# Patient Record
Sex: MALE | URBAN - METROPOLITAN AREA
[De-identification: names, ages, dates, MRNs, and addresses within clinical notes are randomized per-mention and may not be internally consistent; named-entity substitution may affect disease eponyms.]

---

## 2019-04-05 ENCOUNTER — HISTORICAL (OUTPATIENT)
Dept: ADMINISTRATIVE | Facility: HOSPITAL | Age: 54
End: 2019-04-05

## 2019-05-01 ENCOUNTER — HISTORICAL (OUTPATIENT)
Dept: ENDOSCOPY | Facility: HOSPITAL | Age: 54
End: 2019-05-01

## 2019-05-17 ENCOUNTER — HISTORICAL (OUTPATIENT)
Dept: ADMINISTRATIVE | Facility: HOSPITAL | Age: 54
End: 2019-05-17

## 2019-05-17 LAB
ABS NEUT (OLG): 14.35 X10(3)/MCL (ref 2.1–9.2)
ALBUMIN SERPL-MCNC: 1.6 GM/DL (ref 3.4–5)
ALBUMIN/GLOB SERPL: 0.3 RATIO (ref 1.1–2)
ALP SERPL-CCNC: 1134 UNIT/L (ref 45–117)
ALT SERPL-CCNC: 93 UNIT/L (ref 12–78)
AST SERPL-CCNC: 140 UNIT/L (ref 15–37)
BASOPHILS NFR BLD MANUAL: 0 %
BILIRUB SERPL-MCNC: 19.5 MG/DL (ref 0.2–1)
BILIRUBIN DIRECT+TOT PNL SERPL-MCNC: 15.6 MG/DL
BILIRUBIN DIRECT+TOT PNL SERPL-MCNC: 3.9 MG/DL
BUN SERPL-MCNC: 8 MG/DL (ref 7–18)
CALCIUM SERPL-MCNC: 8.4 MG/DL (ref 8.5–10.1)
CANCER AG19-9 SERPL-ACNC: 10.4 IU/ML (ref 0–35)
CEA SERPL-MCNC: 2.8 NG/ML
CHLORIDE SERPL-SCNC: 102 MMOL/L (ref 98–107)
CO2 SERPL-SCNC: 23 MMOL/L (ref 21–32)
CREAT SERPL-MCNC: 0.6 MG/DL (ref 0.6–1.3)
EOSINOPHIL NFR BLD MANUAL: 4 %
ERYTHROCYTE [DISTWIDTH] IN BLOOD BY AUTOMATED COUNT: 14.1 % (ref 11.5–14.5)
GLOBULIN SER-MCNC: 5.4 GM/ML (ref 2.3–3.5)
GLUCOSE SERPL-MCNC: 99 MG/DL (ref 74–106)
GRANULOCYTES NFR BLD MANUAL: 84 % (ref 43–75)
HCT VFR BLD AUTO: 40.1 % (ref 40–51)
HGB BLD-MCNC: 13.5 GM/DL (ref 13.5–17.5)
LYMPHOCYTES NFR BLD MANUAL: 12 % (ref 20.5–51.1)
MCH RBC QN AUTO: 32.1 PG (ref 26–34)
MCHC RBC AUTO-ENTMCNC: 33.7 GM/DL (ref 31–37)
MCV RBC AUTO: 95.5 FL (ref 80–100)
MONOCYTES NFR BLD MANUAL: 0 % (ref 2–9)
PLATELET # BLD AUTO: 343 X10(3)/MCL (ref 130–400)
PLATELET # BLD EST: ADEQUATE 10*3/UL
PMV BLD AUTO: 10.2 FL (ref 7.4–10.4)
POTASSIUM SERPL-SCNC: 3.1 MMOL/L (ref 3.5–5.1)
PROT SERPL-MCNC: 7 GM/DL (ref 6.4–8.2)
RBC # BLD AUTO: 4.2 X10(6)/MCL (ref 4.5–5.9)
RBC MORPH BLD: NORMAL
SODIUM SERPL-SCNC: 134 MMOL/L (ref 136–145)
WBC # SPEC AUTO: 16.2 X10(3)/MCL (ref 4.5–11)

## 2019-05-21 ENCOUNTER — HISTORICAL (OUTPATIENT)
Dept: RADIOLOGY | Facility: HOSPITAL | Age: 54
End: 2019-05-21

## 2019-05-24 ENCOUNTER — HOSPITAL ENCOUNTER (OUTPATIENT)
Dept: MEDSURG UNIT | Facility: HOSPITAL | Age: 54
End: 2019-05-29
Attending: INTERNAL MEDICINE | Admitting: INTERNAL MEDICINE

## 2019-05-24 ENCOUNTER — HISTORICAL (OUTPATIENT)
Dept: INFUSION THERAPY | Facility: HOSPITAL | Age: 54
End: 2019-05-24

## 2019-05-24 LAB
ABS NEUT (OLG): 13.25 X10(3)/MCL (ref 2.1–9.2)
ALBUMIN SERPL-MCNC: 1.2 GM/DL (ref 3.4–5)
ALBUMIN/GLOB SERPL: 0.3 RATIO (ref 1.1–2)
ALP SERPL-CCNC: 979 UNIT/L (ref 45–117)
ALT SERPL-CCNC: 75 UNIT/L (ref 12–78)
AST SERPL-CCNC: 154 UNIT/L (ref 15–37)
BASOPHILS NFR BLD MANUAL: 0 %
BILIRUB SERPL-MCNC: 15.5 MG/DL (ref 0.2–1)
BILIRUBIN DIRECT+TOT PNL SERPL-MCNC: 12.9 MG/DL
BILIRUBIN DIRECT+TOT PNL SERPL-MCNC: 2.6 MG/DL
BUN SERPL-MCNC: 14 MG/DL (ref 7–18)
CALCIUM SERPL-MCNC: 7.4 MG/DL (ref 8.5–10.1)
CHLORIDE SERPL-SCNC: 106 MMOL/L (ref 98–107)
CO2 SERPL-SCNC: 23 MMOL/L (ref 21–32)
CREAT SERPL-MCNC: 0.4 MG/DL (ref 0.6–1.3)
EOSINOPHIL NFR BLD MANUAL: 0 %
ERYTHROCYTE [DISTWIDTH] IN BLOOD BY AUTOMATED COUNT: 14.5 % (ref 11.5–14.5)
GLOBULIN SER-MCNC: 4.2 GM/ML (ref 2.3–3.5)
GLUCOSE SERPL-MCNC: 59 MG/DL (ref 74–106)
GRANULOCYTES NFR BLD MANUAL: 90 % (ref 43–75)
HCT VFR BLD AUTO: 35.2 % (ref 40–51)
HGB BLD-MCNC: 12.3 GM/DL (ref 13.5–17.5)
LYMPHOCYTES NFR BLD MANUAL: 4 % (ref 20.5–51.1)
MCH RBC QN AUTO: 33.3 PG (ref 26–34)
MCHC RBC AUTO-ENTMCNC: 34.9 GM/DL (ref 31–37)
MCV RBC AUTO: 95.4 FL (ref 80–100)
MONOCYTES NFR BLD MANUAL: 4 % (ref 2–9)
NEUTS BAND NFR BLD MANUAL: 2 % (ref 0–10)
PLATELET # BLD AUTO: 256 X10(3)/MCL (ref 130–400)
PLATELET # BLD EST: ADEQUATE 10*3/UL
PMV BLD AUTO: 10.2 FL (ref 7.4–10.4)
POTASSIUM SERPL-SCNC: 2.8 MMOL/L (ref 3.5–5.1)
PROT SERPL-MCNC: 5.4 GM/DL (ref 6.4–8.2)
RBC # BLD AUTO: 3.69 X10(6)/MCL (ref 4.5–5.9)
RBC MORPH BLD: NORMAL
SODIUM SERPL-SCNC: 137 MMOL/L (ref 136–145)
WBC # SPEC AUTO: 14.6 X10(3)/MCL (ref 4.5–11)

## 2019-05-25 LAB
APPEARANCE, UA: ABNORMAL
BACTERIA #/AREA URNS AUTO: ABNORMAL /[HPF]
BILIRUB UR QL STRIP: >10 MG/DL
COLOR UR: ABNORMAL
GLUCOSE (UA): NORMAL
HGB UR QL STRIP: NEGATIVE
HYALINE CASTS #/AREA URNS LPF: ABNORMAL /[LPF]
KETONES UR QL STRIP: NEGATIVE
LEUKOCYTE ESTERASE UR QL STRIP: NEGATIVE
NITRITE UR QL STRIP: NEGATIVE
PH UR STRIP: 6 [PH] (ref 4.5–8)
PROT UR QL STRIP: 50 MG/DL
RBC #/AREA URNS AUTO: ABNORMAL /[HPF]
SP GR UR STRIP: 1.03 (ref 1–1.03)
SQUAMOUS #/AREA URNS LPF: ABNORMAL /[LPF]
UROBILINOGEN UR STRIP-ACNC: 2 MG/DL
WBC #/AREA URNS AUTO: ABNORMAL /HPF

## 2022-05-01 NOTE — H&P
* Final Report *  Subjective Feeling ok today. Was able to eat yesterday.   Review of Systems Constitutional: no fever, + fatigue, + weakness  Eye: + jaundice  Respiratory: no cough, no wheezing, no shortness of breath  Cardiovascular: no chest pain, no palpitations, no edema  Gastrointestinal: no nausea, vomiting, or diarrhea. No abdominal pain  Integumentary: no skin rash or abnormal lesion  Neurologic: no headache, no dizziness, no asterixis, no weakness or numbness    Objective Vitals & Measurements T: 36.3 °C (Oral) TMIN: 36.3 °C (Oral) TMAX: 37.2 °C (Oral) HR: 71(Peripheral) HR: 19(Monitored) RR: 18 BP: 136/79 SpO2: 98%   Physical Exam General: ill appeairng in no acute distress  Eye: PERRLA, EOMI, + icteric  HENT: oropharynx without erythema/exudate, oropharynx and nasal mucosal surfaces moist  Neck: no thyromegaly or lymphadenopathy, trachea midline  Respiratory: symmetrical chest expansion and respiratory effort, clear to auscultation bilaterally  Cardiovascular: regular rate and rhythm without murmurs, gallops or rubs  Gastrointestinal: soft, non-tender, non-distended with normal bowel sounds, without masses to palpation  Integumentary: no rashes or skin lesions present  Neurologic: cranial nerves intact, no asterixis, awake, alert, and oriented  Psych: good insight, appropriate mood, normal affect  Lab Results   Test Name Test Result Date/Time Comments   Sodium Lvl 135 mmol/L (Low) 04/05/2019 05:00 CDT    Potassium Lvl 3.0 mmol/L (Low) 04/05/2019 05:00 CDT    Chloride 100 mmol/L 04/05/2019 05:00 CDT    CO2 29 mmol/L 04/05/2019 05:00 CDT    Calcium Lvl 8.2 mg/dL (Low) 04/05/2019 05:00 CDT    Glucose Lvl 106 mg/dL 04/05/2019 05:00 CDT    BUN 7 mg/dL 04/05/2019 05:00 CDT    Creatinine 0.50 mg/dL (Low) 04/05/2019 05:00 CDT    Bili Total 18.6 mg/dL (High) 04/05/2019 05:00 CDT    Bili Direct 15.0 mg/dL (High) 04/05/2019 05:00 CDT    Bili Indirect 3.6 mg/dL (High) 04/05/2019 05:00 CDT    AST 70 unit/L (High)  04/05/2019 05:00 CDT    ALT 68 unit/L 04/05/2019 05:00 CDT    Alk Phos 495 unit/L (High) 04/05/2019 05:00 CDT    Total Protein 6.5 gm/dL 04/05/2019 05:00 CDT    Albumin Lvl 1.8 gm/dL (Low) 04/05/2019 05:00 CDT    PT 16.1 second(s) (High) 04/04/2019 05:04 CDT Low intensity therapy  PT: 17.9-22.2 seconds  INR: 1.5- 2.0    Mod. intensity therapy  PT: 22.2- 30.2 SECONDS  INR: 2.0-3.0    High intensity therapy  PT: 26.3 - 34 SEC  INR: 2.5-3.5   INR 1.30 (High) 04/04/2019 05:04 CDT    WBC 16.0 x10(3)/mcL (High) 04/05/2019 05:00 CDT    Hgb 10.7 gm/dL (Low) 04/05/2019 05:00 CDT    Hct 30.0 % (Low) 04/05/2019 05:00 CDT    Platelet 319 x10(3)/mcL 04/05/2019 05:00 CDT    Assessment/Plan Alcohol liver disease  12 cm liver mass  Periportal and retroperitoneal adenopathy (preliminary + for carcinoma)  weight loss  Anorexia  Normocytic anemia    Liver mass:  Given his history, primary concern is for primary HCC in the setting of alcohol liver disease. While entities such as cholangiocarcinoma and metastatic process are still possible as well, HCC is probably more likely.  AFP 2.3  s/p IR biopsy of liver mass on 04/04/19  s/p EUS today which revealed extensive periportal adenopathy compression the CBD along with multiple retroperitoneal and periaortic adenopathy. FNB of periportal adenopathy preliminarily positive for carcinoma  Will await liver mass and adenopathy pathology to discern what type of carcinoma this is.  He will not be a candidate for liver transplant if this is HCC  It does not appear that he will be a good candidate for loco-regional therapy due to markedly elevated TB, CTP C, and evidence of disease spread already (adenopathy).  If this is HCC, he could be considered for sorafenib. If cholangiocarcinoma or metastatic process, chemotherapy can be considered. If metastatic process, then endoscopy would be needed to look for primary.      Alcohol liver disease:  MELD 19, CTP C  No steroids needed  Encourage high  protein, 2 gm sodium diet. Supplemental nutrition recommended  Alcohol cessation  Monitor LFTs and INR  No varices on EGD/EUS today  Mild ascites seen on EUS.   No HE  Check HBsAb and vaccinate if not immune  Immune to HAV    Biliary dilation:  EUS confirmed extrinsic compression of the mid/upper CBD by adenopathy with upstream dilation and downstream decompression. There was no biliary stasis (i.e. sludge) in the proximal CBD and gallbladder is contracted.  It is conceivable that this is not affecting any biliary drainage but difficult to prove without empiric placement of CBD stent and then seeing if bilirubin would improve.   Regardless large part of bilirubin elevation is due to tumor burden and some component of alcohol liver disease.  I would await biopsy results to help determine prognosis and only plan on any potential intervention if it would change/alter treatment outcomes      Result type: Gastroenterology Progress Note   Result date: April 05, 2019 17:29 CDT   Result status: Auth (Verified)   Result title: Progress/SOAP Note   Performed by: Alisa Adames MD on April 05, 2019 18:03 CDT   Verified by: Alisa Adames MD on April 05, 2019 18:03 CDT   Encounter info: [id number], [facility], Inpatient, 4/1/2019 - 4/10/2019

## 2022-05-04 NOTE — HISTORICAL [facility] CERNER
This is a historical note converted from [employer]. Formatting and pictures may have been removed.  Please reference [employer] for original formatting and attached multimedia. Chief Complaint  Liver Cancer  History of Present Illness  Ms. 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 Patient seems to?patient?was barely able to stand on the scales when he arrived. He has a history of alcohol abuse and originally presented in December with a 20 pound weight loss and elevated LFTs.? Percutaneous liver biopsy was performed in April and significant for metastatic poorly differentiated carcinoma of occult primary.? This month, she is planning to initiate chemo.? Patient was told that his prognosis was very poor at that time.? Presented today with a drastic change in prognosis.? Per the infusion center staff, he was able to take care of himself and able to do regular ADLs a week ago.? When he presented today he had extreme weakness and could not even get on the scale without help.? Patient reported extreme weakness and primarily being bedbound in the last week.? He also reports shortness of breath, abdominal pain, no worsening of lower extremity swelling.? With the assistance of , infusion center care team and I had a discussion with the patient about his prognosis.? Per the infusion center in Dr. Mcpherson, treatment and chemotherapy is no longer an option.? They feel that he is no longer able to care for himself.? They would like to have the patient admitted that he can be evaluated for inpatient hospice as he has no family here to care for him.? All of these things were discussed with the patient and he expressed understanding.? He would like to be evaluated by hospice for end-of-life care.  Review of Systems  10-point ROS performed and negative except  as above.  Physical Exam  Vitals & Measurements  T:?36.4? ?C (Oral)? HR:?102(Peripheral)? HR:?102(Monitored)? RR:?18? BP:?89/60? WT:?75?kg?  Gen:?Ill-appearing, cachectic 53yo male  HEENT: Scleral icterus noted. ?Normocephalic, atraumatic.  Heart: RRR, no murmurs, gallops, clicks or rubs.  Lungs: CTAB without rales, wheezes or rhonchi. Normal work of breathing. Chest rise symmetrical on inspiration.  Abd: RUQ abdominal tenderness to palpation. No guarding. Hepatomegaly.?Bowel sounds present.  Extremities: 4+LE pitting edema bilaterally. Radial 1+ BL. Pedal pulses non-palpable due to edema. Lower extremities are cool to the touch.  MSK: No obvious deformities. Moves all extremities purposefully.  Neuro: Responds well to commands.  Skin: Warm, dry and without rashes.  Assessment/Plan  End-Stage Liver Cancer  ?? - admit patient in-patient care and bridge to hospice  ?? - will consult case management to assistance hospice evaluation  ?? - will initiate comfort measures  ?? - continue IVF for now?  ?  Severe Hypokalemia  ?? - K 2.8  ?? - Will give 80mEq KCl  ?  ?  Disposition: This is a 54-year-old? male?with history of alcohol?use and now end-stage?liver cancer.? Hospice to evaluate for end-of-life care in an inpatient hospice facility. ?Patient will be discharged to hospice once accepted.  ?  ?  ?   Halle Jaquez, DO  Internal Medicine PGY-1  Pager [phone]   Problem List/Past Medical History  Ongoing  Adenocarcinoma of liver  Alcoholism  Diabetes mellitus type II  Hepatitis  Hyperbilirubinaemia  Jaundice  Historical  No qualifying data  Procedure/Surgical History  Colonoscopy (None) (05/01/2019)  Colonoscopy, flexible; with removal of tumor(s), polyp(s), or other lesion(s) by snare technique (05/01/2019)  Esophagogastroduodenoscopy (05/01/2019)  Esophagogastroduodenoscopy, flexible, transoral; diagnostic, including collection of specimen(s) by brushing or washing, when performed (separate procedure)  (05/01/2019)  Excision of Ascending Colon, Via Natural or Artificial Opening Endoscopic, Diagnostic (05/01/2019)  Inspection of Upper Intestinal Tract, Via Natural or Artificial Opening Endoscopic (05/01/2019)  Polypectomy (05/01/2019)  Drainage of Mesenteric Lymphatic, Via Natural or Artificial Opening Endoscopic Approach, Diagnostic (04/05/2019)  Endoscopic Ultrasonography (Upper) (04/05/2019)  Esophagogastroduodenoscopy (Upper) (04/05/2019)  Esophagogastroduodenoscopy, flexible, transoral; with transendoscopic ultrasound-guided intramural or transmural fine needle aspiration/biopsy(s), (includes endoscopic ultrasound examination limited to the esophagus, stomach or duodenum, and adjacent stru (04/05/2019)  Ultrasonography of Abdomen and Pelvis (04/05/2019)  US FNA First Lesion (Upper) (04/05/2019)  Excision of Left Lobe Liver, Percutaneous Approach, Diagnostic (04/04/2019)   Medications  Inpatient  NS (0.9% Sodium Chloride) Infusion, 1000 mL, IV, Once  Home  Flagyl 500 mg / 100 ml premix, IV, q8hr  K-Dur 20 oral tablet, extended release, 20 mEq= 1 tab(s), Oral, BID  megestrol 40 mg/mL oral suspension  oxyCODONE 10 mg oral tablet, Oral, q6hr  senna 8.6 mg oral tablet, Oral, qPM  Zofran ODT 8 mg oral tablet, disintegrating, 8 mg= 1 tab(s), Oral, q6hr, PRN  Allergies  No Known Medication Allergies  Social History  Alcohol  Past, 05/21/2019  Sexual  Gender Identity Identifies as male., 05/22/2019  Substance Abuse  Never, 04/01/2019  Tobacco  Never (less than 100 in lifetime), N/A, 05/24/2019  Family History  Diabetes mellitus type 2: Mother.  Unknown cause of morbidity or mortality 07-JUN-2017 17:45:38<$>: Father.  Lab Results  Labs Last 24 Hours?  ?Chemistry? Hematology/Coagulation?   Sodium Lvl: 137 mmol/L (05/24/19 11:45:00) WBC:?14.6 x10(3)/mcL?High (05/24/19 11:53:00)   Potassium Lvl:?2.8 mmol/L?Critical (05/24/19 11:45:00) RBC:?3.69 x10(6)/mcL?Low (05/24/19 11:53:00)   Chloride: 106 mmol/L (05/24/19 11:45:00)  Hgb:?12.3 gm/dL?Low (05/24/19 11:53:00)   CO2: 23 mmol/L (05/24/19 11:45:00) Hct:?35.2 %?Low (05/24/19 11:53:00)   Calcium Lvl:?7.4 mg/dL?Low (05/24/19 11:45:00) Platelet: 256 x10(3)/mcL (05/24/19 11:53:00)   Glucose Lvl:?59 mg/dL?Low (05/24/19 11:45:00) MCV: 95.4 fL (05/24/19 11:53:00)   BUN: 14 mg/dL (05/24/19 11:45:00) MCH: 33.3 pg (05/24/19 11:53:00)   Creatinine:?0.4 mg/dL?Low (05/24/19 11:45:00) MCHC: 34.9 gm/dL (05/24/19 11:53:00)   eGFR-AA: >105 (05/24/19 11:45:00) RDW: 14.5 % (05/24/19 11:53:00)   eGFR-TALYA: >105 (05/24/19 11:45:00) MPV: 10.2 fL (05/24/19 11:53:00)   Bili Total:?15.5 mg/dL?High (05/24/19 11:45:00) Abs Neut:?13.25 x10(3)/mcL?High (05/24/19 11:53:00)   Bili Direct:?12.9 mg/dL?High (05/24/19 11:45:00)    Bili Indirect:?2.6 mg/dL?High (05/24/19 11:45:00)    AST:?154 unit/L?High (05/24/19 11:45:00)    ALT: 75 unit/L (05/24/19 11:45:00)    Alk Phos:?979 unit/L?High (05/24/19 11:45:00)    Total Protein:?5.4 gm/dL?Low (05/24/19 11:45:00)    Albumin Lvl:?1.2 gm/dL?Low (05/24/19 11:45:00)    Globulin:?4.2 gm/mL?High (05/24/19 11:45:00)    A/G Ratio:?0.3 ratio?Low (05/24/19 11:45:00)    ?  ?      Patient was brought to the medicine for care until hospice can arrive.? I went to once again speak with patient regarding all the things that had previously been addressed in the infusion clinic.? Richard ([id number]) with the language line was used to translate.? I confirmed that he did understand that this was the end of his life and that he would likely pass away soon.? The patient expressed understanding.? I again confirmed that he does want to go hospice.? He agreed with this.? We then discussed CODE STATUS.? I explained to him what always had failed.? I told him that the use of CPR, defibrillation, and intubation would not prolong his line in fact would likely cause him pain should he survive a code.? He expressed understanding.? He is chosen to go DNR at this time.? Hospice will arrive shortly to evaluate  him for inpatient hospice.   Reviewed with resident, agree with assessment and plan.   Patient seen and examined,?reviewed with the resident,?agree with?assessment?and?plan.? We will proceed with end-of-life care as?agreed upon with patient?and begin placement for?hospice care in nursing home. ?Patient is DNR/DNI comfort care

## 2022-05-04 NOTE — HISTORICAL [facility] CERNER
This is a historical note converted from [employer]. Formatting and pictures may have been removed.  Please reference [employer] for original formatting and attached multimedia. Chief Complaint  Weakness, fatigue, elevated liver enzymes and jaundice  History of Present Illness  54 year old  male with?DM who presented with weakness and weight loss and found to have elevated LFTs and liver mass. He does endorse a 20 LB weight loss in December.?He noticed more fatigue in March and April and was seen by gastroenterology. He does have a previous history of ETOH abuse, drinking up to 12 beers daily but has quit several months ago. The patient underwent IR biopsy of the liver mass which revealed carcinoma. He did have some periportal lymphadenopathy. He has never had an endoscopy or colonoscopy.  Review of Systems  +fatigue, weakness  +light colored stool, dark colored urine  ?  Physical Exam  Gen: awake, alert, jaundice  HEENT: scleral icterus  Resp: lungs CTA B/L  CV: RRR  Abd: soft, nt, nd  ?  ?  Radiology  Reason For Exam  Liver mass  ?  Radiology Report  PROCEDURE: CT guided Biopsy of a left liver mass.?  ?  INDICATION: 54 years of age Male requiring left liver mass biopsy for  histologic evaluation.  ?  ATTENDING: Denzel Valle  ?  ANESTHESIA: Local and Moderate Sedation - Sedation was provided by  physician: An independent trained observer, sedation nurse was present  to assist in the monitoring of the patients level of consciousness and  physiologic status.  ?  Moderate Sedation was performed for 20 minutes.  ?  ?  TECHNIQUE/FINDINGS:  After the risks, benefits and alternatives were discussed, consent was  obtained. ?A time out was performed to verify the patients identity  and procedure.  ?  The left liver mass was localized with CT to confirm a window for  biopsy. The skin over the biopsy site was cleaned and prepped in  normal sterile fashion. Subcutaneous tissues were anesthetized with a  lidocaine solution. A  18-gauge coaxial needle was advanced to the  mass.?  ?  After confirming the position the stylus was removed and 2 core  biopsies were obtained. The pathologist was present and confirmed  adequacy of the samples.?  ?  The patient tolerated the procedure well. ?  ?  Estimated blood loss: < 10 mL.  ?  Followup CT scan demonstrated no evidence of hematoma.  ?  IMPRESSION:  CT -guided left liver mass biopsy as described above.  ?  Reason For Exam  Neoplasm Primary  ?  Radiology Report  History:  Liver mass.  ?  Reference Exam:  CT to April 2019.  ?  Radiopharmaceutical:  Tc99m MDP 30.2 mCi IV.  ?  Findings:  Whole-body images were obtained.  ?  There is a single punctate focus of radiotracer activity localizing to  a mid left rib. There is a tiny sclerotic lesion within the left  fourth rib on the comparison CT image 124 series 4.  ?  Otherwise no suspicious focal radiotracer activity identified.  ?  Impression:  Single subtle focus of radiotracer activity which may correspond to a  tiny sclerotic lesion of the left fourth rib. Recommend attention on  future surveillance CTs.  ?  Otherwise no scintigraphic evidence of osseous metastatic disease.  ?  ?  Assessment/Plan  54 year old  male with?DM who presented with weakness and weight loss and found to have elevated LFTs and liver mass.s/p IR biopsy of liver mass on 04/04/19  s/p EUS on 4/5 which revealed extensive periportal adenopathy compression the CBD along with multiple retroperitoneal and periaortic adenopathy.? FNB of periportal adenopathy preliminarily positive for carcinoma.  ?   -vitals, labs and images reviewed  -proceed with EGD/colonoscopy today  -discussed procedure with patient and consents obtained  ?   Yessi Gil MD  [employer] General Surgery   Problem List/Past Medical History  Ongoing  Alcoholism  Diabetes mellitus type II  Hepatitis  Hyperbilirubinaemia  Jaundice  Historical  No qualifying data  Procedure/Surgical History  Drainage of Mesenteric  Lymphatic, Via Natural or Artificial Opening Endoscopic Approach, Diagnostic (04/05/2019)  Endoscopic Ultrasonography (Upper) (04/05/2019)  Esophagogastroduodenoscopy (Upper) (04/05/2019)  Esophagogastroduodenoscopy, flexible, transoral; with transendoscopic ultrasound-guided intramural or transmural fine needle aspiration/biopsy(s), (includes endoscopic ultrasound examination limited to the esophagus, stomach or duodenum, and adjacent stru (04/05/2019)  Ultrasonography of Abdomen and Pelvis (04/05/2019)  US FNA First Lesion (Upper) (04/05/2019)  Excision of Left Lobe Liver, Percutaneous Approach, Diagnostic (04/04/2019)   Medications  Inpatient  No active inpatient medications  Home  Flagyl 500 mg / 100 ml premix, IV, q8hr  folic acid 1 mg oral tablet, 1 mg= 1 tab(s), Oral, Daily  Protonix 40 mg ORAL enteric coated tablet, 40 mg= 1 tab(s), Oral, Daily  thiamine 100 mg oral tablet, 100 mg= 1 tab(s), Oral, Daily  Allergies  No Known Medication Allergies  Social History  Alcohol  Current, 04/01/2019  Substance Abuse  Never, 04/01/2019  Tobacco  Never (less than 100 in lifetime), No, 04/05/2019  Never (less than 100 in lifetime), N/A, 04/04/2019  Family History  Diabetes mellitus type 2: Mother.  Unknown cause of morbidity or mortality 07-JUN-2017 17:45:38<$>: Father.      Recent diagnosis of liver mass consistent with a carcinoma.? IHC not necessarily?conclusive for hepatocellular carcinoma. ?He is here for upper and lower endoscopy to look for primary?cancer.? He continues to have a poor appetite and weight loss.? He is accompanied by his boss today.

## 2022-05-04 NOTE — HISTORICAL [facility] CERNER
This is a historical note converted from [employer]. Formatting and pictures may have been removed.  Please reference [employer] for original formatting and attached multimedia. 0915: LARRY Salgado in infusion called to inform me that Mr. 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 Upon entering the room he is laying upright in the bed in Rm 529, jaundice appearance of skin and sclera of the eye. HR: 102, BP: 89/60. AL Salgado states that they were unable to weigh him due to his inability to stand and that it took 3 staff to get him from the wheelchair to the bed. Since his last visit with Dr. Mcpherson he has gone from walking, driving, and working (ECO-1) to now being completed bed or wheelchair bound (ECOG 3-4). Per Dr. Mcpherson we will not start chemotherapy that we will consult Hospice or send him to the ER to be admitted. The patients worsening condition and functional status was discussed with him, as well as with Miya, nursing aide who speaks Tajik, as well as Samia and Dr. Jaquez from internal medicine. The patient does not speak English which is why a staff member who speaks fluent Mongolian was asked to interpret. He verbalizes that he understands his prognosis and that he wants Hospice at this time. Hospice was consulted and medicine staff admitted the patient here and assumed care.

## 2022-05-04 NOTE — HISTORICAL [facility] CERNER
This is a historical note converted from Mica. Formatting and pictures may have been removed.  Please reference Mica for original formatting and attached multimedia. History of Present Illness  Past medical history: Alcoholic.? NIDDM.? Hepatitis.  ?  Social history:?Single. ?No children.? No friends or family?in the [place].??He has brothers and sisters back in [place]. ?Lives in [place]. ?[employer] by occupation. ?However,?due to progressive weakness, has been unable to work?after the diagnosis of cancer recently.? Denies history of tobacco or illicit drug abuse.? Has been drinking alcohol fairly heavily?but in variable amounts for many years, typically over the weekends.  ?  Family history:?No family history of malignancy.  ?  Health maintenance:?No screening colonoscopy ever.  ?  ?  History of present illness:  54-year-old gentleman referred from GI department with a diagnosis of metastatic poorly differentiated carcinoma.  ?  He is a 54-year-old alcoholic  gentleman presented with weakness and weight loss and was found to have elevated LFTs and liver mass, 12 cm on CT.? Experienced 20 pound weight loss in December 2018.? Noticed more fatigue in March and April and was evaluated by GI.? Previously, used to drink up to 12 beers daily but as of now, has quit several months ago.? Percutaneous liver biopsy showed metastatic carcinoma.? EUS guided biopsy of periportal lymphadenopathy also revealed metastatic carcinoma.? EGD and colonoscopy failed to reveal any upper or lower GI source of primary malignancy.  ?  Hospitalized 04/05/2019-04/10/2019.? He presented to the emergency department with 2 to 3-week history of jaundice.? This happened rather suddenly.? Reported 20 pound weight loss over preceding 3 months which was unintentional.? Reported right upper quadrant/right middle quadrant abdominal pain upon deep palpation.? Denied chest pain, shortness of breath, palpitations, headache, visual changes, dizziness,  nausea, vomiting, GI bleeding.? Did not report any trouble eating or swallowing, nor any changes in bowel habits.? Extensive history of alcohol abuse and medication noncompliance.  ?  04/01/2019: Complete abdominal ultrasound:  Questionable echogenic sludge filling the lumen of gallbladder; diffuse thickening of the gallbladder wall is of indeterminate chronicity and may represent acute versus chronic sequelae of cholecystitis; mild hepatomegaly.  ?  04/01/2019: MRI MRCP without contrast (abdominal pain and jaundice):  1.? 11 cm lesion within the left liver with multiple smaller hepatic lesions and periportal/periaortic adenopathy;  2.? Mild intrahepatic biliary dilatation with dilatation of the common hepatic duct and abrupt narrowing along the proximal CBD;  3.? Nonspecific 1.7 cm left adrenal nodule  ?  04/01/2019: Chest x-ray (significant leukocytosis, rule out infection):  No acute cardiopulmonary process  ?  04/02/2019: CTs C/A/P with and without contrast (11 cm liver mass, chronic alcoholic):  Large left hepatic lobe mass (up to almost 12 cm) suspicious for primary hepatic malignancy, enhancement pattern not typical for HCC; several other liver lesions in both lobes; left portal vein appears occluded; main portal vein is compressed by some periportal adenopathy but is grossly patent; consider cholangio-carcinoma; other liver lesions suspicious for metastasis; periportal, gastrohepatic, and retroperitoneal lymphadenopathy; indeterminate 1.6 cm left adrenal nodule; small, indeterminate right upper lobe nodule; small bilateral pleural effusions; pleural calcifications, question prior asbestos exposure.  ?  04/04/2019: Liver mass: Needle core biopsy (CT-guided biopsy of left liver mass):  1.? Small core of poorly differentiated carcinoma in the background of fibrotic tissue;  2.? No liver parenchyma present for evaluation  (Differential diagnosis: Cholangio-carcinoma; tumor of pancreatico biliary  origin)  ?  04/05/2019: Upper EUS (common bile duct dilatation seen on CT scan; suspected mass in liver on CT scan):  -Extrinsic compression in the gastric body in the gastric antrum  -Duodenal deformity  -Many malignant appearing lymph nodes visualized in the azeem hepatis region.? FNA performed.  -Many lymph nodes visualized in the upper abdominal periaortic region, endosonographic appearance suspicious for undifferentiated carcinoma.  -Dilatation in the upper third of the main bile duct measuring up to 1.4 cm with transition point at the location of the largest 2.2 cm x 2.2 cm periportal lymph node which is causing extrinsic compression of the bile duct with upstream dilatation to 1.4 cm; the distal CBD small and decompressed.  -Mass found in the left lobe of the liver, endosonographic borders poorly defined  -No significant pathology in the ampulla.  ?  04/05/2019: FNA periportal lymph node (EUS revealed extensive periportal adenopathy compressing the common bile duct along with multiple retroperitoneal and periaortic lymphadenopathy):  Metastatic poorly differentiated carcinoma.  ?  04/09/2019: Nuclear medicine whole-body bone scan:  Single subtle focus of radiotracer activity which may correspond to a tiny sclerotic lesion of the left fourth rib.  ?  05/01/2019: Ascending colon polyp, biopsy (colonoscopy: A single 2 mm sessile polyp in the ascending colon, removed with cold snare polypectomy; scattered diverticula in the sigmoid colon, consistent with mild diverticulosis; small internal and external hemorrhoids):  -Tubular adenomatous polyp  -No evidence of malignancy  ?  05/01/2019: EGD:  Reflux esophagitis; diffuse erythema and scattered erosions in the antrum; normal bulb and second portion of the duodenum.  ?  04/09/2019:  Sodium 133.? Bilirubin 17.4.? Direct bilirubin 13.6.? AST 84.? ALT 66, normal.? Alk phos 578.? Albumin 1.7.? Hemoglobin 10.8.? MCV normal.? WBC 17.4, predominantly neutrophils, 84%.?  Platelets normal.  ?  Maximum bilirubin 23.1 on 04/01/2019  Hepatitis C antibody negative 04/01/2019  Hepatitis B surface antibody nonreactive 04/07/2019 04/01/2019: Syphilis antibody negative; HIV nonreactive; hepatitis A IgM nonreactive; hepatitis B core IgM nonreactive; hepatitis B surface antigen negative; hepatitis C antibody nonreactive.  ?  CEA 2.3, normal, on 04/02/2019  CA 19-9 level 8.7, normal, on 04/02/2019  AFP tumor marker level 2.3, normal, on 04/02/2019  ?  ?  Interval history:  05/17/2019:  Presents for initial oncology consultation.? Reports progressively worsening weakness.? Used to work as a farmer but unable to work anymore. ?ECOG 2, if not ECOG 3.? Feels like laying in bed all the time. ?Jaundiced. ?No fevers or chills.? Right upper quadrant abdominal pain, mostly mild to moderate, occasionally severe. ?No nausea or vomiting. ?No hematemesis, melena, or hematochezia.? Appetite has declined. Has lost 20 pounds since December 2018.? No appetite.  ?  ?  Review of systems:  All systems reviewed, and found to be negative except for the symptoms detailed above.  ?  ?  Physical examination:  VITAL SIGNS:? Reviewed.? ?  GENERAL:? In no apparent distress.?  HEAD:? No signs of head trauma.  EYES:? Pupils are equal.? Extraocular motions intact.?  EARS:? Hearing grossly intact.  MOUTH:? Oropharynx is normal.  NECK:? No adenopathy, no JVD.? ?  CHEST:? Chest with clear breath sounds bilaterally.? No wheezes, rales, or rhonchi.?  CARDIAC:? Regular rate and rhythm.? S1 and S2, without murmurs, gallops, or rubs.  VASCULAR:? No Edema.? Peripheral pulses normal and equal in all extremities.  ABDOMEN:? Soft, without detectable tenderness.? No sign of distention.? No? ?rebound or guarding, and no masses palpated.? ?Bowel Sounds normal.  MUSCULOSKELETAL:? Good range of motion of all major joints. Extremities without clubbing, cyanosis or edema.?  NEUROLOGIC EXAM:? Alert and oriented x 3.? No focal sensory or  strength deficits.? ?Speech normal.? Follows commands.  PSYCHIATRIC:? Mood normal.  SKIN:? No rash or lesions.  05/17/2019: Deeply jaundiced; appears weak; ambulates independently;?no palpable hepatospleno megaly; no tenderness in the right upper abdominal quadrant;?no other intra-abdominal organs or masses palpable.? No palpable supraclavicular lymphadenopathy.  ?  ?  Assessment:  # Metastatic poorly differentiated carcinoma?of occult primary, status post percutaneous liver biopsy 04/04/2019 (+) and upper EUS guided FNA of periportal lymph node 04/05/2019 (+).  -Sites of involvement:  12 cm left liver mass, enhancement pattern not typical for HCC; multiple smaller hepatic lesions in both lobes and periportal/periaortic lymphadenopathy; left portal vein occluded; periportal, gastrohepatic, and retroperitoneal lymphadenopathy; indeterminate 1.6 cm left adrenal nodule; small, indeterminate right upper lobe nodule; pleural calcifications; dilated proximal common bile duct from enlarged periportal lymph node; mild intrahepatic biliary dilatation with dilatation of the common hepatic duct and abrupt narrowing along the proximal CBD; no primary malignancy on EGD and colonoscopy; CEA, CA 19-9, and AFP levels normal 04/02/2019  ?  ?  Plan:  I have clearly discussed with him that his prognosis is extremely guarded, rather poor.? It is quite possible that he may die in near future.? I have advised him?that it will be a reasonable option for him?to go back to [place] to spend?the last few?months of his life with his friends and family. ?He has no friends or family here in the [place].? He lives by himself and is all alone.? He seems to have no social support system.  ?  However,?he wishes?to try chemotherapy for now.? Chemotherapy is his only treatment option at this time.? He denies significant right upper quadrant pain, therefore,?ablative therapy to the large liver tumor is not necessarily indicated.? Moreover,?the large liver  tumor is not amenable?to arterially directed therapy?because of severe jaundice.  ?  Check MSI/MMR status of the tumor?(pembrolizumab can be used?only for MSI-H/dMMR tumors)  ?  Will empirically treat him with?chemotherapy for metastatic?biliary tract?carcinomas,?with combination of gemcitabine/cisplatin as detailed below.?  He will be restaged with CT scans after 4 cycles of chemotherapy per protocol; if no disease progression,?then 4 more cycles of chemotherapy  ?  Schedule is cisplatin 25 mg/m? IV (1 hour infusion) followed by gemcitabine 1000 mg/m? IV (30 minute infusion) on days 1 and 8; cycles repeated every 21 days initially for 4 cycles, then reevaluate and continue for an additional 4 cycles if warranted. if patients did not have disease progression at 12 weeks, they could continue with another 12 weeks of the same regimen.?  Treatment to be?discontinued at 24 weeks because of disease progression, patient or clinician choice, or unacceptable toxic effects.  ?  Discussed potential side effects of chemotherapy with him. ?He wishes to proceed with treatment.  ?   Will place a PICC line for chemotherapy.  ?   Will check CBC and CMP?weekly?to monitor for side effects.? He will have a formal chemotherapy teaching session with our nursing staff?before starting treatment.  ?   Today, as baseline, before starting chemotherapy, will check CBC, CMP,?CEA, CA 19-9,?and contrast-enhanced CT scans of chest, abdomen, and pelvis?(abdominal CT?will be quadruple phase, liver protocol).  ?   Follow-up with me in 2 weeks. ?He understands and agrees with this plan.  ?  ?  Discussion:  As per the trial reported in [place] Journal of Medicine 2010: 362: 2549-0882 (April 8, 2010), the standard of care for metastatic gallbladder carcinoma is gemcitabine/cisplatin, combination.? Schedule is cisplatin 25 mg/m? IV (1 hour infusion) followed by gemcitabine 1000 mg/m? IV (30 minute infusion) on days 1 and 8; cycles repeated every 21  days initially for 4 cycles, then reevaluate and continue for an additional 4 cycles if warranted. if patients did not have disease progression at 12 weeks, they could continue with another 12 weeks of the same regimen.? Treatment was discontinued at 24 weeks or because of disease progression, patient or clinician choice, or unacceptable toxic effects.? Biliary obstruction per se was not considered to be disease progression in the absence of radium neurologically confirmed disease progression, and treatment would be recommended after further biliary stenting and normalization of liver function.  ?  After a median follow-up of 8.2 months, the median overall survival was 11.7 months in the cisplatin/gemcitabine group, and 8.1 months in the gemcitabine group.? The median progression free survival was 8 months in the cisplatin/gemcitabine group, and 5 months in the gemcitabine only group.? The rate of tumor control amount patients in the cisplatin/gemcitabine group was significantly increased (81.4% versus 71.8%) compared with gemcitabine only 0.? Adverse events were similar in the 2 groups, with the exception of more neutropenia in the cisplatin/gemcitabine group; the number of neutropenia associated infections were similar in the 2 groups.? Thus, as compared with gemcitabine alone, cisplatin/gemcitabine was associated with a significant survival advantage without the addition of substantial toxicity.?  ?  In the gemcitabine only group, gemcitabine was administered at a dose of 1000 mg/m? on days 1, 8, and 15 every 4 weeks, initially for 3 cycles.  ?  Biliary obstruction per se was not considered to be disease progression in the absence of radiologically confirmed disease progression, and treatment would be recommended after further biliary stenting and normalization of liver function.  Physical Exam  Vitals & Measurements  T:?36.5? ?C (Oral)? HR:?89(Peripheral)? RR:?20? BP:?108/81? SpO2:?100%?  HT:?162?cm?  WT:?77.3?kg? BMI:?29.45?   Problem List/Past Medical History  Ongoing  Alcoholism  Diabetes mellitus type II  Hepatitis  Hyperbilirubinaemia  Jaundice  Historical  No qualifying data  Procedure/Surgical History  Colonoscopy (None) (05/01/2019)  Colonoscopy, flexible; with removal of tumor(s), polyp(s), or other lesion(s) by snare technique (05/01/2019)  Esophagogastroduodenoscopy (05/01/2019)  Esophagogastroduodenoscopy, flexible, transoral; diagnostic, including collection of specimen(s) by brushing or washing, when performed (separate procedure) (05/01/2019)  Excision of Ascending Colon, Via Natural or Artificial Opening Endoscopic, Diagnostic (05/01/2019)  Inspection of Upper Intestinal Tract, Via Natural or Artificial Opening Endoscopic (05/01/2019)  Polypectomy (05/01/2019)  Drainage of Mesenteric Lymphatic, Via Natural or Artificial Opening Endoscopic Approach, Diagnostic (04/05/2019)  Endoscopic Ultrasonography (Upper) (04/05/2019)  Esophagogastroduodenoscopy (Upper) (04/05/2019)  Esophagogastroduodenoscopy, flexible, transoral; with transendoscopic ultrasound-guided intramural or transmural fine needle aspiration/biopsy(s), (includes endoscopic ultrasound examination limited to the esophagus, stomach or duodenum, and adjacent stru (04/05/2019)  Ultrasonography of Abdomen and Pelvis (04/05/2019)  US FNA First Lesion (Upper) (04/05/2019)  Excision of Left Lobe Liver, Percutaneous Approach, Diagnostic (04/04/2019)   Medications  Flagyl 500 mg / 100 ml premix, IV, q8hr  folic acid 1 mg oral tablet, 1 mg= 1 tab(s), Oral, Daily  Protonix 40 mg ORAL enteric coated tablet, 40 mg= 1 tab(s), Oral, Daily  thiamine 100 mg oral tablet, 100 mg= 1 tab(s), Oral, Daily  Allergies  No Known Medication Allergies  Social History  Alcohol  Current, 04/01/2019  Substance Abuse  Never, 04/01/2019  Tobacco  Never (less than 100 in lifetime), No, 05/01/2019  Never (less than 100 in lifetime), N/A, 04/04/2019  Family  History  Diabetes mellitus type 2: Mother.  Unknown cause of morbidity or mortality 07-JUN-2017 17:45:38<$>: Father.  Health Maintenance  Health Maintenance  ???Pending?(in the next year)  ??? ??OverDue  ??? ? ? ?Diabetes Screening due??and every?  ??? ? ? ?Alcohol Misuse Screening due??01/01/19??and every 1??year(s)  ??? ??Due?  ??? ? ? ?ADL Screening due??05/16/19??and every 1??year(s)  ??? ? ? ?Aspirin Therapy for CVD Prevention due??05/16/19??and every 1??year(s)  ??? ? ? ?Blood Pressure Screening due??05/16/19??and every?  ??? ? ? ?Depression Screening due??05/16/19??and every?  ??? ? ? ?Diabetes Maintenance-Eye Exam due??05/16/19??and every?  ??? ? ? ?Diabetes Maintenance-Fasting Lipid Profile due??05/16/19??and every?  ??? ? ? ?Diabetes Maintenance-Foot Exam due??05/16/19??and every?  ??? ? ? ?Diabetes Maintenance-Medication Prescribed due??05/16/19??and every 1??year(s)  ??? ? ? ?Diabetes Maintenance-Microalbumin due??05/16/19??Variable frequency  ??? ? ? ?Lipid Screening due??05/16/19??and every?  ??? ? ? ?Smoking Cessation (Diabetes) due??05/16/19??and every?  ??? ? ? ?Tetanus Vaccine due??05/16/19??and every 10??year(s)  ??? ??Due In Future?  ??? ? ? ?Obesity Screening not due until??01/01/20??and every 1??year(s)  ??? ? ? ?Diabetes Maintenance-HgbA1c not due until??03/31/20??and every 1??year(s)  ??? ? ? ?Diabetes Maintenance-Urine Dipstick not due until??04/01/20??and every 1??year(s)  ??? ? ? ?Diabetes Maintenance-Serum Creatinine not due until??04/09/20??and every 1??year(s)  ??? ? ? ?Body Mass Index Check not due until??04/30/20??and every 1??year(s)  ???Satisfied?(in the past 1 year)  ??? ??Satisfied?  ??? ? ? ?Blood Pressure Screening on??05/01/19.??Satisfied by Erika Pina RN  ??? ? ? ?Diabetes Maintenance-HgbA1c on??04/01/19.??Satisfied by Yoly Martinez  ??? ? ? ?Diabetes Maintenance-Serum Creatinine on??04/09/19.??Satisfied by Opal Avila  ??? ? ? ?Diabetes Maintenance-Urine  Dipstick on??04/01/19.??Satisfied by Opal Avila  ??? ? ? ?Diabetes Screening on??04/09/19.??Satisfied by Opal Avila  ??? ? ? ?Influenza Vaccine on??04/01/19.??Satisfied by Charito Obrien RN  ??? ? ? ?Obesity Screening on??04/01/19.??Satisfied by Edilia JUAREZ, Anabell BALLARD  ?  ?      Start Megace?800 mg p.o. daily to boost appetite.  Start oxycodone 10 mg p.o. every 4-6 hours as needed?for right upper quadrant abdominal pain?(prescription for 50 pills?activated electronically).  Start Senokot 1 tablet twice daily regularly?around-the-clock?to attempt constipation with narcotics.  Advised to drink plenty of fluids to prevent constipation.

## 2022-05-04 NOTE — HISTORICAL [facility] CERNER
This is a historical note converted from Mica. Formatting and pictures may have been removed.  Please reference Mica for original formatting and attached multimedia. Admit and Discharge Dates  Admit Date: 05/24/2019  Discharge Date: 05/29/2019  ?  Physicians  Attending Physician - Preston AVILA, Janeth MCCLENDON  Attending Physician - Pamela AVILA, Delvin BALLARD  Attending Physician - Pamela AVILA, Delvin BALLARD  Admitting Physician - Pamela AVILA, Delvin BALLARD  Primary Care Physician - No PCP, No  ?  Discharge Diagnosis  1.?Adenocarcinoma of liver?C22.9  2.?End of life care?Z51.5  3.?Hyperbilirubinaemia?E80.6  4.?Impaired mobility?Z74.09  Unspecified severe protein-calorie malnutrition?E43  Surgical Procedures  No procedures recorded for this visit.  Immunizations  05/29/2019 - pneumococcal 13-valent conjugate vaccine?  ?  Admission Information  Ms. 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 Patient seems to?patient?was barely able to stand on the scales when he arrived. He has a history of alcohol abuse and originally presented in December with a 20 pound weight loss and elevated LFTs.? Percutaneous liver biopsy was performed in April and significant for metastatic poorly differentiated carcinoma of occult primary.? This month, she is planning to initiate chemo.? Patient was told that his prognosis was very poor at that time.? Presented today with a drastic change in prognosis.? Per the infusion center staff, he was able to take care of himself and able to do regular ADLs a week ago.? When he presented today he had extreme weakness and could not even get on the scale without help.? Patient reported extreme weakness and primarily being bedbound in the last week.? He also reports shortness of breath, abdominal pain, no worsening of lower extremity swelling.? With the assistance  of , [facility] center care team and I had a discussion with the patient about his prognosis.? Per the infusion center in Dr. Mcpherson, treatment and chemotherapy is no longer an option.? They feel that he is no longer able to care for himself.? They would like to have the patient admitted that he can be evaluated for inpatient hospice as he has no family here to care for him.? All of these things were discussed with the patient and he expressed understanding.? He would like to be evaluated by hospice for end-of-life care.  Hospital Course  Patient was admitted for evaluation for in-patient hospice. He was evaluated by hospice and determined not to meet criteria for in-patient hospice. Case Management was consulted and were able to place him in a nursing home where he can receive around-the-clock care and hospice services. He will be sent to [facility] for end-of-life care.  Time Spent on discharge  30 Minutes  Objective  Vitals & Measurements  T:?36.3? ?C (Oral)? TMIN:?36.3? ?C (Oral)? TMAX:?37.4? ?C (Oral)? HR:?82(Peripheral)? RR:?20? BP:?99/69? SpO2:?99%?  Physical Exam  Gen:?Ill-appearing, cachectic 55yo male  HEENT: Scleral icterus noted. ?Normocephalic, atraumatic.  Heart: RRR, no murmurs, gallops, clicks or rubs.  Lungs: CTAB without rales, wheezes or rhonchi. Normal work of breathing. Chest rise symmetrical on inspiration.  Abd: tenderness to palpation throughout. No guarding. Hepatomegaly.?Bowel sounds present.  Extremities: 4+LE pitting edema bilaterally. Radial 1+ BL. Pedal pulses non-palpable due to edema. Lower extremities are cool to the touch.  MSK: No obvious deformities. Moves all extremities purposefully.  Neuro: Responds well to commands.  Skin: Warm, dry and without rashes.  Patient Discharge Condition  Hemodynamically stable.  Discharge Disposition  Transfer to [facility].   Discharge Medication Reconciliation  Prescribed  fentaNYL?25 mcg, TOP, q72hr  tamsulosin  (tamsulosin 0.4 mg oral capsule)?0.4 mg, Oral, Daily  Continue  megestrol (megestrol 40 mg/mL oral suspension)  ondansetron (Zofran ODT 8 mg oral tablet, disintegrating)?8 mg, Oral, q6hr, PRN nausea/vomiting  oxyCODONE (oxyCODONE 10 mg oral tablet), Oral, q6hr  potassium chloride (K-Dur 20 oral tablet, extended release)?20 mEq, Oral, BID  Discontinue  metroNIDAZOLE (Flagyl 500 mg / 100 ml premix), IV, q8hr  senna (senna 8.6 mg oral tablet), Oral, qPM  ?  Education and Orders Provided  Catheter-Associated Urinary Tract Infection FAQs - SHEA(Irish)  ?  Follow up  Follow-up PRN with Medicine Clinic  Patient does not need post-acute care follow-up as he is going to a nursing home with hospice for end of life care.  ?      Patient seen and examined,?reviewed with the resident,?agree with?assessment?and?plan.? Seen on morning rounds